# Patient Record
Sex: FEMALE | Race: WHITE | NOT HISPANIC OR LATINO | URBAN - METROPOLITAN AREA
[De-identification: names, ages, dates, MRNs, and addresses within clinical notes are randomized per-mention and may not be internally consistent; named-entity substitution may affect disease eponyms.]

---

## 2018-02-23 NOTE — PATIENT DISCUSSION
The types of intraocular lenses were reviewed with the patient along with a discussion of their various strengths and weaknesses, pt does not qualify for the AV, pt elects Custom Toric IOL, goal katie, pt understands and accepts need for spectacles at intermediate and distance.

## 2018-04-18 NOTE — PATIENT DISCUSSION
Patient advised of the right to post-operative care by the surgeon. Patient is fully informed of, and agreed to, co-management with their primary optometric physician. Post-operative care by the surgeon is not medically necessary and co-management is clinically appropriate. Patient has received itemization of fees related to cataract surgery. Transfer of care letter completed for the patient. Transfer care of right eye to Dr. Renata Landeros on 4/18/18. Patient instructed to call immediately if any new distortion, blurring, decreased vision or eye pain.

## 2018-04-18 NOTE — PATIENT DISCUSSION
Patient advised of the right to post-operative care by the surgeon. Patient is fully informed of, and agreed to, co-management with their primary optometric physician. Post-operative care by the surgeon is not medically necessary and co-management is clinically appropriate. Patient has received itemization of fees related to cataract surgery. Transfer of care letter completed for the patient. Transfer care of Right eye to Dr. Thomas Pineda on 4/18/2018. Patient instructed to call immediately if any new distortion, blurring, decreased vision or eye pain.

## 2018-05-15 NOTE — PATIENT DISCUSSION
Patient advised of the right to post-operative care by the surgeon. Patient is fully informed of, and agreed to, co-management with their primary optometric physician. Post-operative care by the surgeon is not medically necessary and co-management is clinically appropriate. Patient has received itemization of fees related to cataract surgery. Transfer of care letter completed for the patient. Transfer care of both eyes to Dr. Dixie Fitzgerald on 5/15/18. Patient instructed to call immediately if any new distortion, blurring, decreased vision or eye pain.

## 2018-05-15 NOTE — PATIENT DISCUSSION
3 weeks PO: Patient is doing well post-operatively. The importance of post-op drop compliance was emphasized. Drop scheduled reviewed with patient. Patient to call if any visual changes or concerns.

## 2018-05-15 NOTE — PATIENT DISCUSSION
ok to proceed with IOL OS due to FD.  Dec for Sx OS made today.  Goal OS is for  Custom Toric, goal -2.00.  (monovision).

## 2021-02-19 ENCOUNTER — IMPORTED ENCOUNTER (OUTPATIENT)
Dept: URBAN - METROPOLITAN AREA CLINIC 31 | Facility: CLINIC | Age: 51
End: 2021-02-19

## 2021-02-19 PROBLEM — H18.832: Noted: 2021-02-19

## 2021-02-19 PROCEDURE — 92015 DETERMINE REFRACTIVE STATE: CPT

## 2021-02-19 PROCEDURE — 92004 COMPRE OPH EXAM NEW PT 1/>: CPT

## 2021-02-19 PROCEDURE — 92310 CONTACT LENS FITTING OU: CPT

## 2021-02-19 NOTE — PATIENT DISCUSSION
1.  Refractive error2. Astigmatism Dispense trial contacts OU. To discontinue and call if any problems. Trial toric OD and sphere OS. EW for a week then Clear Care. Replace q mos. 2 weeks CL check.

## 2022-04-01 ASSESSMENT — VISUAL ACUITY
OD_SC: 20/25
OD_SC: J1+12''
OD_CC: J717''
OS_SC: J1+12''
OS_SC: 20/20

## 2022-04-01 ASSESSMENT — TONOMETRY
OD_IOP_MMHG: 14
OS_IOP_MMHG: 15

## 2022-05-27 NOTE — PATIENT DISCUSSION
Carotid Artery Disease  -Your most recent carotid duplex showed a widely patent carotid endarterectomy site and the side has very minimal disease  -Continue taking the aspirin and statin medication   -We will get an updated ultrasound in 1 year and follow up in the office afterwards  -Please call office with questions, concerns, or new symptoms      Carotid Artery Disease   AMBULATORY CARE:   Carotid artery disease (CAD)  means the major blood vessels in your neck are narrowed or becoming blocked  These 2 major blood vessels are called the carotid arteries  They supply your brain with blood  The narrow or blocked blood vessels increase your risk for a stroke  CAD is also called carotid artery stenosis  Have someone call your local emergency number (911 in the [address] [place] [address],[address]) if:   You have any of the following signs of a stroke:      Numbness or drooping on one side of your face     Weakness in an arm or leg    Confusion or difficulty speaking    Dizziness, a severe headache, or vision loss    You have any of the following signs of a heart attack:      Squeezing, pressure, or pain in your chest    You may  also have any of the following:     Discomfort or pain in your back, neck, jaw, stomach, or arm    Shortness of breath    Nausea or vomiting    Lightheadedness or a sudden cold sweat    Call your doctor if:   You have questions or concerns about your condition or care  Common signs and symptoms:  CAD develops slowly  You may have no signs or symptoms until you have a mini-stroke, or transient ischemic attack (TIA)  A TIA is a temporary lack of blood flow to your brain  A TIA goes away quickly and does not cause permanent damage  A TIA may be a warning sign that you are about to have a stroke  If you have any symptoms of a TIA or stroke, seek care immediately  Warning signs of a stroke:   The words BE FAST can help you remember and recognize warning signs of a stroke:  B = Balance:  Sudden loss of balance    E = VIGAMOX QID TO EYE OF SURGERY. Eyes:  Loss of vision in one or both eyes    F = Face:  Face droops on one side    A = Arms:  Arm drops when both arms are raised    S = Speech:  Speech is slurred or sounds different    T = Time:  Time to get help immediately       Treatment  depends on how narrow your arteries have become  Treatment also depends on your symptoms and your general health  The goal of treatment is to lower your risk for a stroke  You may need any of the following:  Medicines:      Aspirin,  or other blood thinner, may be recommended  These will help prevent blood clots from forming in your carotid arteries  If your healthcare provider wants you to take aspirin, do not take acetaminophen or ibuprofen instead  Cholesterol medicine  lowers your cholesterol level  Blood pressure medicine  lowers or helps control your blood pressure  Procedures  can help open blocked arteries:     Carotid endarterectomy (CEA)  is used to cut and remove plaque buildup from your arteries  Carotid angioplasty and stenting (WALT)  is used to push the plaque against the artery wall with a balloon device  Then, a stent is placed to keep the artery open  A stent is a small metal mesh tube  Prevent a stroke:   Do not smoke, and avoid secondhand smoke  Nicotine and other chemicals in cigarettes and cigars increase your risk for a stroke  Ask your healthcare provider for information if you currently smoke and need help to quit  E-cigarettes or smokeless tobacco still contain nicotine  Talk to your healthcare provider before you use these products  Eat a variety of healthy foods  Healthy foods include fruit, vegetables, whole-grain breads, low-fat dairy products, chicken, and fish  Choose fish that are high in omega-3 fatty acids, such as salmon and fresh tuna  Ask your healthcare provider for more information on a heart healthy diet and the DASH eating plan  Limit sodium (salt)  Sodium may increase your blood pressure   Add less table salt to your foods  Read food labels and choose foods that are low in sodium  Your healthcare provider may suggest you follow a low sodium diet  Reach or maintain a healthy weight  Extra weight makes your heart work harder  Ask your healthcare provider what a healthy weight is for you  He or she can help you create a safe weight loss plan  Even a weight loss of 10% of your extra body weight can help your heart function better  Exercise regularly  Exercise helps improve heart function and can help you manage your weight  Exercise can also help lower your cholesterol and blood sugar levels  Try to get at least 30 minutes of exercise 5 times each week  Try to be physically active every day  This may include walking, riding a bicycle, or swimming  Your healthcare provider can help you create an exercise plan that works best for you  Limit alcohol  Alcohol can increase your blood pressure and triglyceride levels  A drink of alcohol is 12 ounces of beer, 5 ounces of wine, or 1½ ounces of liquor  Follow up with your doctor as directed:  Write down your questions so you remember to ask them during your visits  © Copyright [employer] 2022 Information is for End User's use only and may not be sold, redistributed or otherwise used for commercial purposes  All illustrations and images included in CareNotes® are the copyrighted property of [employer]  or [facility] Arnold Goncalves   The above information is an  only  It is not intended as medical advice for individual conditions or treatments  Talk to your doctor, nurse or pharmacist before following any medical regimen to see if it is safe and effective for you

## 2024-01-10 NOTE — PATIENT DISCUSSION
Patient understands there is an increased risk of corneal edema after cataract surgery. (4) no limitation